# Patient Record
Sex: FEMALE | Race: WHITE | NOT HISPANIC OR LATINO | Employment: FULL TIME | URBAN - NONMETROPOLITAN AREA
[De-identification: names, ages, dates, MRNs, and addresses within clinical notes are randomized per-mention and may not be internally consistent; named-entity substitution may affect disease eponyms.]

---

## 2018-07-03 ENCOUNTER — TELEMEDICINE ORIGINATING SITE VISIT (OUTPATIENT)
Dept: MEDICAL GROUP | Facility: CLINIC | Age: 53
End: 2018-07-03
Payer: MEDICARE

## 2018-07-03 ENCOUNTER — TELEMEDICINE2 (OUTPATIENT)
Dept: MEDICAL GROUP | Age: 53
End: 2018-07-03
Payer: MEDICARE

## 2018-07-03 VITALS
HEIGHT: 61 IN | SYSTOLIC BLOOD PRESSURE: 107 MMHG | OXYGEN SATURATION: 96 % | BODY MASS INDEX: 22.09 KG/M2 | WEIGHT: 117 LBS | DIASTOLIC BLOOD PRESSURE: 69 MMHG | RESPIRATION RATE: 16 BRPM | HEART RATE: 98 BPM | TEMPERATURE: 99.3 F

## 2018-07-03 DIAGNOSIS — G35 MS (MULTIPLE SCLEROSIS) (HCC): ICD-10-CM

## 2018-07-03 DIAGNOSIS — Z86.39 H/O HASHIMOTO THYROIDITIS: ICD-10-CM

## 2018-07-03 DIAGNOSIS — N80.9 ENDOMETRIOSIS: ICD-10-CM

## 2018-07-03 DIAGNOSIS — Z13.220 SCREENING CHOLESTEROL LEVEL: ICD-10-CM

## 2018-07-03 DIAGNOSIS — E55.9 VITAMIN D DEFICIENCY: ICD-10-CM

## 2018-07-03 PROCEDURE — 99204 OFFICE O/P NEW MOD 45 MIN: CPT | Performed by: INTERNAL MEDICINE

## 2018-07-03 ASSESSMENT — PATIENT HEALTH QUESTIONNAIRE - PHQ9: CLINICAL INTERPRETATION OF PHQ2 SCORE: 0

## 2018-07-05 ASSESSMENT — ENCOUNTER SYMPTOMS
SEIZURES: 1
SENSORY CHANGE: 1
CARDIOVASCULAR NEGATIVE: 1
TINGLING: 1
GASTROINTESTINAL NEGATIVE: 1
PSYCHIATRIC NEGATIVE: 1
DOUBLE VISION: 0
FOCAL WEAKNESS: 1
MUSCULOSKELETAL NEGATIVE: 1
WEIGHT LOSS: 0
RESPIRATORY NEGATIVE: 1
BLURRED VISION: 1

## 2018-07-05 NOTE — PROGRESS NOTES
Subjective:      Jessica Colon is a 53 y.o. female who presents with Referral Needed            HPI 53-year-old female is here today to establish medical care.    1. Multiple sclerosis  Patient was diagnosed with MS in 1992. She was last seen by a neurologist except as 7 years ago. Patient's last brain MRI was 2009. Patient has been on SSDI since 2000 due to her MS. She has a history of being treated with Avonex and Copaxone but these medications not well tolerated due to side effects of severe exhaustion and scleroderma at the injection site. Patient did not feel she benefited from these medications. She was also treated with Neurontin and Provigil to help with symptomatic relief. She has not had a PCP in quite some time and would go to the emergency room with acute MS flare/exacerbation. Patient would be treated with a steroid and discharged from the emergency room. Patient complains of peripheral visual disturbances, right lower extremity paresthesias greater than left, extreme exhaustion, balance issues and instability. Patient denies bowel or bladder issues. Patient also has been having trouble with left upper extremity  and sensation. Patient is requesting a referral to neurology    2. Hashimoto's thyroiditis.  Diagnosed within the last 5 years and was seen by an endocrinologist in [place]. No recent lab work, currently not taking medications and is asymptomatic. Patient has had thyroid ultrasound for small thyroid nodules that were stable.    3. History of endometriosis  Patient underwent numerous abdominal laparoscopies and eventually underwent total abdominal hysterectomy.    4. Prevention.  Preventative labs completed greater than 2 years ago  Mammogram and Pap smear, completed greater than 5 years ago  Has never completed colonoscopy      Review of Systems   Constitutional: Positive for malaise/fatigue. Negative for weight loss.   HENT: Negative.    Eyes: Positive for blurred vision. Negative for  "double vision.   Respiratory: Negative.    Cardiovascular: Negative.    Gastrointestinal: Negative.    Genitourinary: Negative.    Musculoskeletal: Negative.    Skin: Negative.    Neurological: Positive for tingling, sensory change, focal weakness and seizures.   Psychiatric/Behavioral: Negative.           Objective:     /69   Pulse 98   Temp 37.4 °C (99.3 °F)   Resp 16   Ht 1.549 m (5' 1\")   Wt 53.1 kg (117 lb)   SpO2 96%   BMI 22.11 kg/m²      Physical Exam   Constitutional: She is oriented to person, place, and time. She appears well-developed and well-nourished.   HENT:   Head: Normocephalic and atraumatic.   Right Ear: External ear normal.   Left Ear: External ear normal.   Nose: Nose normal.   Mouth/Throat: Oropharynx is clear and moist. No oropharyngeal exudate.   Eyes: Conjunctivae and EOM are normal. Pupils are equal, round, and reactive to light. No scleral icterus.   No nystagmus noted   Neck: Normal range of motion. Thyromegaly present.   No thyroid nodules felt, no TTP   Cardiovascular: Normal rate, regular rhythm, S1 normal, S2 normal, normal heart sounds and intact distal pulses.  Exam reveals no gallop and no friction rub.    No murmur heard.  Pulmonary/Chest: Effort normal and breath sounds normal. No respiratory distress. She has no wheezes. She has no rales.   Abdominal: Soft. Bowel sounds are normal. She exhibits no distension. There is no tenderness. There is no rebound and no guarding.   Musculoskeletal: Normal range of motion. She exhibits no edema, tenderness or deformity.   Lymphadenopathy:     She has no cervical adenopathy.   Neurological: She is alert and oriented to person, place, and time. She has normal strength and normal reflexes. No cranial nerve deficit. Gait normal.   Difficult detailed neurologic exam via telemedicine  Muscle strength 4/5 lower extremity bilateral  Muscle strength decreased left upper extremity greater than right. Decreased  with left upper " extremity  Decreased sensation in upper extremities bilaterally  No gait disturbances noted   Skin: Skin is warm and dry. No rash noted. No erythema.   Psychiatric: She has a normal mood and affect. Her behavior is normal. Judgment normal.   Nursing note and vitals reviewed.              Assessment/Plan:     1. MS (multiple sclerosis) (HCC)  Referral to neurology via telemedicine/in person visit    2. H/O Hashimoto thyroiditis  Appropriate labs ordered to include thyroid function panel, anti-TPO, anti-tTG antibody  Stable    3. Endometriosis  Stable    $. Prevention  Preventative labs ordered  Patient due for colonoscopy  Recommend mammogram

## 2018-07-06 ENCOUNTER — TELEPHONE (OUTPATIENT)
Dept: MEDICAL GROUP | Facility: CLINIC | Age: 53
End: 2018-07-06

## 2018-07-06 NOTE — TELEPHONE ENCOUNTER
Neurology is requesting:   MRI (of the brain and cervical spine with and without contrast)    - prior to September visit.    Please place order.    Thank you!

## 2018-07-09 DIAGNOSIS — G35 MULTIPLE SCLEROSIS (HCC): ICD-10-CM

## 2018-07-10 ENCOUNTER — NON-PROVIDER VISIT (OUTPATIENT)
Dept: MEDICAL GROUP | Facility: CLINIC | Age: 53
End: 2018-07-10
Payer: MEDICARE

## 2018-07-10 DIAGNOSIS — G35 MS (MULTIPLE SCLEROSIS) (HCC): ICD-10-CM

## 2018-07-10 LAB
APPEARANCE UR: CLEAR
BILIRUB UR STRIP-MCNC: NEGATIVE MG/DL
COLOR UR AUTO: NORMAL
GLUCOSE UR STRIP.AUTO-MCNC: NEGATIVE MG/DL
KETONES UR STRIP.AUTO-MCNC: NEGATIVE MG/DL
LEUKOCYTE ESTERASE UR QL STRIP.AUTO: NEGATIVE
NITRITE UR QL STRIP.AUTO: NEGATIVE
PH UR STRIP.AUTO: 7 [PH] (ref 5–8)
PROT UR QL STRIP: NORMAL MG/DL
RBC UR QL AUTO: NORMAL
SP GR UR STRIP.AUTO: 1.01
UROBILINOGEN UR STRIP-MCNC: 1 MG/DL

## 2018-07-10 PROCEDURE — 81002 URINALYSIS NONAUTO W/O SCOPE: CPT | Performed by: INTERNAL MEDICINE

## 2018-07-10 PROCEDURE — 36415 COLL VENOUS BLD VENIPUNCTURE: CPT | Performed by: INTERNAL MEDICINE

## 2018-07-10 NOTE — NON-PROVIDER
Jessica Isaiah is a 53 y.o. female here for a non-provider visit for Venipuncture    If abnormal was an in office provider notified upon receipt of results (if so, indicate provider)? Yes  Routed to PCP? Yes

## 2018-07-12 DIAGNOSIS — G35 MULTIPLE SCLEROSIS (HCC): ICD-10-CM

## 2018-07-19 ENCOUNTER — TELEPHONE (OUTPATIENT)
Dept: MEDICAL GROUP | Facility: CLINIC | Age: 53
End: 2018-07-19

## 2018-07-19 NOTE — TELEPHONE ENCOUNTER
Patient called in with symptoms of a UTI. Urgency, heaviness, burning.    Would like a course of antibiotics going in to the weekend.    Please advise.

## 2018-07-23 ENCOUNTER — NON-PROVIDER VISIT (OUTPATIENT)
Dept: MEDICAL GROUP | Facility: CLINIC | Age: 53
End: 2018-07-23
Payer: MEDICARE

## 2018-07-23 ENCOUNTER — TELEMEDICINE2 (OUTPATIENT)
Dept: MEDICAL GROUP | Age: 53
End: 2018-07-23
Payer: MEDICARE

## 2018-07-23 ENCOUNTER — TELEMEDICINE ORIGINATING SITE VISIT (OUTPATIENT)
Dept: MEDICAL GROUP | Facility: CLINIC | Age: 53
End: 2018-07-23
Payer: MEDICARE

## 2018-07-23 VITALS
OXYGEN SATURATION: 97 % | SYSTOLIC BLOOD PRESSURE: 109 MMHG | BODY MASS INDEX: 22.92 KG/M2 | TEMPERATURE: 97.5 F | DIASTOLIC BLOOD PRESSURE: 73 MMHG | HEART RATE: 67 BPM | WEIGHT: 121.3 LBS

## 2018-07-23 DIAGNOSIS — R30.0 DYSURIA: ICD-10-CM

## 2018-07-23 DIAGNOSIS — R79.89 ELEVATED SERUM CREATININE: ICD-10-CM

## 2018-07-23 DIAGNOSIS — R20.2 PARESTHESIA: ICD-10-CM

## 2018-07-23 DIAGNOSIS — D72.829 LEUKOCYTOSIS, UNSPECIFIED TYPE: ICD-10-CM

## 2018-07-23 LAB
APPEARANCE UR: CLEAR
BILIRUB UR STRIP-MCNC: NEGATIVE MG/DL
COLOR UR AUTO: YELLOW
GLUCOSE UR STRIP.AUTO-MCNC: NEGATIVE MG/DL
KETONES UR STRIP.AUTO-MCNC: NEGATIVE MG/DL
LEUKOCYTE ESTERASE UR QL STRIP.AUTO: NEGATIVE
NITRITE UR QL STRIP.AUTO: NEGATIVE
PH UR STRIP.AUTO: 7.5 [PH] (ref 5–8)
PROT UR QL STRIP: NEGATIVE MG/DL
RBC UR QL AUTO: NORMAL
SP GR UR STRIP.AUTO: 1.02
UROBILINOGEN UR STRIP-MCNC: 1 MG/DL

## 2018-07-23 PROCEDURE — 81002 URINALYSIS NONAUTO W/O SCOPE: CPT | Performed by: INTERNAL MEDICINE

## 2018-07-23 PROCEDURE — 99214 OFFICE O/P EST MOD 30 MIN: CPT | Performed by: INTERNAL MEDICINE

## 2018-07-23 RX ORDER — CIPROFLOXACIN 500 MG/1
500 TABLET, FILM COATED ORAL 2 TIMES DAILY
Qty: 6 TAB | Refills: 0 | Status: SHIPPED | OUTPATIENT
Start: 2018-07-23 | End: 2019-09-19

## 2018-07-23 NOTE — PROGRESS NOTES
CC: Follow-up lab work    Verified identification with patient. Secured video conference with RN presenter in [facility]      HPI:   Jessica presents today with the following.    1. Dysuria  Patient comes in with a one-week history of dysuria, urinary frequency and urgency. Patient denies fevers chills, back pain or hematuria. Patient does not have a history of kidney stones. Does not have a history of frequent urinary tract infections.    2. Elevated serum creatinine  Creatinine elevated at 1.16 on most recent lab work   Patient was unable to complete brain MRI with and without contrast due to elevated creatinine levels.    3. Leukocytosis, unspecified type  White blood cell elevated at 13.2, elevated neutrophil count.      Patient Active Problem List    Diagnosis Date Noted   • Dysuria 07/23/2018   • MS (multiple sclerosis) ([facility]) 07/03/2018   • H/O Hashimoto thyroiditis 07/03/2018   • Endometriosis 07/03/2018       Current Outpatient Prescriptions   Medication Sig Dispense Refill   • ciprofloxacin (CIPRO) 500 MG Tab Take 1 Tab by mouth 2 times a day. 6 Tab 0     No current facility-administered medications for this visit.          Allergies as of 07/23/2018 - Reviewed 07/23/2018   Allergen Reaction Noted   • Demerol Anaphylaxis 07/03/2018        ROS: As per HPI. Patient denies all other cardiac pulmonary, GI, neurologic symptoms.    /73   Pulse 67   Temp 36.4 °C (97.5 °F)   Wt 55 kg (121 lb 4.8 oz)   SpO2 97%   BMI 22.92 kg/m²     Physical Exam:  Gen:         Alert and oriented, No apparent distress.  Neck:        No Lymphadenopathy or Bruits.  Lungs:     Clear to auscultation bilaterally, no wheezes rhonchi or crackles  CV:          Regular rate and rhythm. No murmurs, rubs or gallops.  Abd:         Soft and non distended. No CVA tenderness. Positive suprapubic tenderness  No  Hepatosplenomegaly, No pulsatile masses.                   Ext:          No clubbing, cyanosis, edema.      Assessment and  Plan.   53 y.o. female with the following issues.    1. Dysuria  Urinalysis positive for moderate blood, nitrite negative, leukocyte negative.  Treat empirically with Cipro  Send urine for culture and sensitivity  Push fluids  Recheck UA    - ciprofloxacin (CIPRO) 500 MG Tab; Take 1 Tab by mouth 2 times a day.  Dispense: 6 Tab; Refill: 0    2. Elevated serum creatinine  Recheck BMP after treatment with Cipro    - BASIC METABOLIC PANEL; Future    3. Leukocytosis, unspecified type  Recheck CBC after treatment for UTI    - CBC WITH DIFFERENTIAL; Future            Please note that this dictation was created using voice recognition software. I have made every reasonable attempt to correct obvious errors, but expect that there are errors of grammar and possible content that I did not discover before finalizing note.

## 2018-07-23 NOTE — NON-PROVIDER
Jessica Colon is a 53 y.o. female here for a non-provider visit for UA.    If abnormal was an in office provider notified today (if so, indicate provider)? Yes  Routed to PCP? Yes

## 2018-09-05 ENCOUNTER — TELEPHONE (OUTPATIENT)
Dept: MEDICAL GROUP | Facility: CLINIC | Age: 53
End: 2018-09-05

## 2018-09-05 NOTE — TELEPHONE ENCOUNTER
This note was sent by the referral department    The patient was scheduled for a TeleHealth visit with [facility] on 9/7/2018. Unfortunately the patient did not return the required paperwork or complete the required imaging for this new patient visit and the appointment has been canceled. If she would like to reschedule in the future, please have her give us a call at [phone] and we will assist them in scheduling their appointment    Please inform patient. Thanks

## 2018-09-21 ENCOUNTER — TELEMEDICINE ORIGINATING SITE VISIT (OUTPATIENT)
Dept: MEDICAL GROUP | Facility: CLINIC | Age: 53
End: 2018-09-21
Payer: MEDICAID

## 2018-09-21 ENCOUNTER — TELEMEDICINE2 (OUTPATIENT)
Dept: URGENT CARE | Facility: CLINIC | Age: 53
End: 2018-09-21
Payer: MEDICARE

## 2018-09-21 VITALS
SYSTOLIC BLOOD PRESSURE: 123 MMHG | HEART RATE: 72 BPM | TEMPERATURE: 98.5 F | WEIGHT: 121 LBS | DIASTOLIC BLOOD PRESSURE: 83 MMHG | OXYGEN SATURATION: 98 % | HEIGHT: 62 IN | RESPIRATION RATE: 16 BRPM | BODY MASS INDEX: 22.26 KG/M2

## 2018-09-21 DIAGNOSIS — R29.898 LEFT ARM WEAKNESS: ICD-10-CM

## 2018-09-21 DIAGNOSIS — G35 MULTIPLE SCLEROSIS (HCC): ICD-10-CM

## 2018-09-21 DIAGNOSIS — G35 MS (MULTIPLE SCLEROSIS) (HCC): ICD-10-CM

## 2018-09-21 PROCEDURE — 99214 OFFICE O/P EST MOD 30 MIN: CPT | Performed by: PHYSICIAN ASSISTANT

## 2018-09-21 RX ORDER — METHYLPREDNISOLONE 4 MG/1
TABLET ORAL
Qty: 1 KIT | Refills: 0 | Status: SHIPPED | OUTPATIENT
Start: 2018-09-21 | End: 2019-01-17 | Stop reason: SDUPTHER

## 2018-09-21 ASSESSMENT — ENCOUNTER SYMPTOMS
FEVER: 0
CHILLS: 0
BLURRED VISION: 1
FALLS: 0
WEAKNESS: 1
FOCAL WEAKNESS: 0
DIZZINESS: 0
EYE REDNESS: 0
SENSORY CHANGE: 1
TINGLING: 1
WHEEZING: 0
ABDOMINAL PAIN: 0
EYE DISCHARGE: 0
LOSS OF CONSCIOUSNESS: 0
DIARRHEA: 0
VERTIGO: 0
COUGH: 0
VISUAL CHANGE: 1
JOINT SWELLING: 0

## 2018-09-21 NOTE — PROGRESS NOTES
Subjective:      Jessica Colon is a 53 y.o. female who presents with Shoulder Pain (Left) and Multiple Sclerosis            Patient is a pleasant 53-year-old female via telemedicine who presents with concern regarding progressive left arm weakness over the last 2-3 weeks.  Patient does report a history of MS of which she does report recent history of increased flares.  Patient reports an episode similar within the last year of when patient developed right arm weakness and paresthesia-like pain however not on the left.  She denies any recent injury, trauma or fall.  Patient denies any specific joint pain although does report that the posterior shoulder with radiation down the back of her arm at times into her fourth and fifth digits have a burning-like sensation.  Patient does report remarkable weakness in her left hand and .  Patient does report slight worsening to her vision to where she feels that she has to focus more than usual.  Patient does report that this episode is slightly different as patient does not have the patches of burning into the legs that she had in the past.  Patient unfortunately missed her neurology appointment recently as patient's creatinine was too large to have the MRI with contrast.  Patient is in the process of rescheduling at this time.      Shoulder Pain   This is a new problem. The current episode started 1 to 4 weeks ago. The problem occurs constantly. The problem has been gradually worsening. Associated symptoms include a visual change and weakness. Pertinent negatives include no abdominal pain, chills, congestion, coughing, fever, joint swelling, rash or vertigo. Nothing aggravates the symptoms. Treatments tried: Caffeine. The treatment provided moderate relief.       Review of Systems   Constitutional: Positive for malaise/fatigue. Negative for chills and fever.   HENT: Negative for congestion.    Eyes: Positive for blurred vision. Negative for discharge and redness.  "  Respiratory: Negative for cough and wheezing.    Gastrointestinal: Negative for abdominal pain and diarrhea.   Musculoskeletal: Negative for falls, joint pain and joint swelling.   Skin: Negative for rash.   Neurological: Positive for tingling, sensory change and weakness. Negative for dizziness, vertigo, focal weakness and loss of consciousness.   All other systems reviewed and are negative.         Objective:     /83 (BP Location: Right arm, Patient Position: Sitting)   Pulse 72   Temp 36.9 °C (98.5 °F)   Resp 16   Ht 1.562 m (5' 1.5\")   Wt 54.9 kg (121 lb)   SpO2 98%   BMI 22.49 kg/m²      Physical Exam   Constitutional: She is oriented to person, place, and time. She appears well-developed and well-nourished. No distress.   HENT:   Head: Normocephalic and atraumatic.   Eyes: Pupils are equal, round, and reactive to light. Conjunctivae and EOM are normal.   Neck: Normal range of motion. Neck supple. No tracheal deviation present.   Cardiovascular: Normal rate.    Pulmonary/Chest: Effort normal. No respiratory distress.   Neurological: She is alert and oriented to person, place, and time. She displays abnormal reflex. She exhibits abnormal muscle tone.    substantially weaker on the left than the right-per RN conducting physical. DTRs 1+ LUE..   Shoulder:  Without noted swelling, erythema, ecchymosis, or noted deformity. Non-tender over the bony prominences, also without tenderness over the SC or AC joints.  FROM with Flexion, abduction, internal and external rotation, without noted winging of the scapula.   Negative Neer’s test .      Skin: Skin is warm. No rash noted.   Psychiatric: She has a normal mood and affect. Her behavior is normal. Judgment and thought content normal.   Vitals reviewed.              Assessment/Plan:     1. Multiple sclerosis (HCC)  - MethylPREDNISolone (MEDROL DOSEPAK) 4 MG Tablet Therapy Pack; UAD  Dispense: 1 Kit; Refill: 0    2. Left arm weakness  - " MethylPREDNISolone (MEDROL DOSEPAK) 4 MG Tablet Therapy Pack; UAD  Dispense: 1 Kit; Refill: 0    Discussed various treatments at this time of which patient readily admits that she is done well on injectable steroids.  Unfortunately at this time at the [facility] steroid injections are not readily available.  Patient does report that she did have notable agitation with a prednisone taper in the past however she has not been on  methylprednisolone of which I suspect patient was given a such in the ER in the the past.  Patient is open to starting on such however patient was given very strict precautions along with potential side effects.  I strongly encouraged that if patient progressively remains weaker that she must have evaluation at [facility].  However if symptoms remain baseline then strongly encourage patient either way to follow-up with neurology and pursue further imaging of MRIs as it does appear that patient's MS is getting progressively worse.  Patient given precautionary s/sx that mandate immediate follow up and evaluation in the ED. Advised of risks of not doing so.    DDX, Supportive care, and indications for immediate follow-up discussed with patient.    Instructed to return to clinic or nearest emergency department if we are not available for any change in condition, further concerns, or worsening of symptoms.    The patient demonstrated a good understanding and agreed with the treatment plan.  Please note that this dictation was created using voice recognition software. I have made every reasonable attempt to correct obvious errors, but I expect that there are errors of grammar and possibly content that I did not discover before finalizing the note.

## 2018-10-03 ENCOUNTER — TELEMEDICINE ORIGINATING SITE VISIT (OUTPATIENT)
Dept: MEDICAL GROUP | Facility: CLINIC | Age: 53
End: 2018-10-03
Payer: MEDICAID

## 2018-10-03 ENCOUNTER — TELEMEDICINE2 (OUTPATIENT)
Dept: MEDICAL GROUP | Age: 53
End: 2018-10-03
Payer: MEDICARE

## 2018-10-03 VITALS
SYSTOLIC BLOOD PRESSURE: 120 MMHG | OXYGEN SATURATION: 98 % | BODY MASS INDEX: 22.26 KG/M2 | RESPIRATION RATE: 16 BRPM | TEMPERATURE: 98.6 F | WEIGHT: 121 LBS | HEIGHT: 62 IN | HEART RATE: 82 BPM | DIASTOLIC BLOOD PRESSURE: 78 MMHG

## 2018-10-03 DIAGNOSIS — G35 MS (MULTIPLE SCLEROSIS) (HCC): ICD-10-CM

## 2018-10-03 PROCEDURE — 99213 OFFICE O/P EST LOW 20 MIN: CPT | Performed by: INTERNAL MEDICINE

## 2018-10-03 RX ORDER — MELOXICAM 15 MG/1
15 TABLET ORAL DAILY
Qty: 30 TAB | Refills: 5 | Status: SHIPPED | OUTPATIENT
Start: 2018-10-03 | End: 2019-09-19

## 2018-10-03 NOTE — PROGRESS NOTES
CC: Follow-up MS    Verified identification with patient. Secured video conference with RN presenter in [facility]      HPI:   Jessica presents today with the following.    1. MS (multiple sclerosis) (HCC)  Patient was diagnosed with MS in 1992. She was last seen by a neurologist except as 7 years ago. Patient's last brain MRI was 2009. Patient has been on SSDI since 2000 due to her MS. She has a history of being treated with Avonex and Copaxone but these medications not well tolerated due to side effects of severe exhaustion and scleroderma at the injection site. Patient did not feel she benefited from these medications. She was also treated with Neurontin and Provigil to help with symptomatic relief. She has not had a PCP in quite some time and would go to the emergency room with acute MS flare/exacerbation. Patient would be treated with a steroid and discharged from the emergency room. Patient complains of peripheral visual disturbances, right lower extremity paresthesias greater than left, extreme exhaustion, balance issues and instability. Patient denies bowel or bladder issues. Patient also has been having trouble with left upper extremity  and sensation. Patient is requesting a referral to neurology    Update 10/3/18:   Patient was seen in urgent care on September 21 with an MS flare involving her left arm. Patient was given a steroid taper which initially else her symptoms but now have recurred. Patient describes left arm weakness, paresthesia, difficulty with  but denies pain.  Patient has been having increased stressors in her life, went to [place] for family issues.  Patient has not completed her brain MRI due to elevated creatinine and had to cancel her neurology appointment.  Patient requesting referral to neurology as her insurance has changed and she now has Medicaid.  She is requesting an anti-inflammatory which she has used in the past for her MS. She was treated with Vioxx/Celebrex.  "Patient is reluctant to be treated with steroids.      Patient Active Problem List    Diagnosis Date Noted   • Dysuria 07/23/2018   • MS (multiple sclerosis) (HCC) 07/03/2018   • H/O Hashimoto thyroiditis 07/03/2018   • Endometriosis 07/03/2018       Current Outpatient Prescriptions   Medication Sig Dispense Refill   • meloxicam (MOBIC) 15 MG tablet Take 1 Tab by mouth every day. 30 Tab 5   • MethylPREDNISolone (MEDROL DOSEPAK) 4 MG Tablet Therapy Pack UAD 1 Kit 0   • ciprofloxacin (CIPRO) 500 MG Tab Take 1 Tab by mouth 2 times a day. 6 Tab 0     No current facility-administered medications for this visit.          Allergies as of 10/03/2018 - Reviewed 10/03/2018   Allergen Reaction Noted   • Demerol Anaphylaxis 07/03/2018        ROS: As per HPI. Complains of weakness, mild visual changes, imbalance. Otherwise denies all other cardiopulmonary, GI, neurologic symptoms    /78 (BP Location: Right arm, Patient Position: Sitting)   Pulse 82   Temp 37 °C (98.6 °F)   Resp 16   Ht 1.562 m (5' 1.5\")   Wt 54.9 kg (121 lb)   SpO2 98%   BMI 22.49 kg/m²     Physical Exam:  Gen:         Alert and oriented, No apparent distress.  Neck:        No Lymphadenopathy or Bruits.  Lungs:     Clear to auscultation bilaterally , no wheezes rhonchi or crackles  CV:          Regular rate and rhythm. No murmurs, rubs or gallops.  Abd:         Soft non tender, non distended. Normal active bowel sounds.  No  Hepatosplenomegaly, No pulsatile masses.                 Ext:          No clubbing, cyanosis, edema.  Neuro:     Left upper extremity. Good range of motion of left shoulder girdle. Noted decreased muscle strength 4/5 upper extremity. Decreased breath. Sensation intact.    Assessment and Plan.   53 y.o. female with the following issues.    1. MS (multiple sclerosis) (HCC)  Completed Medrol Dosepak  Trial with Mobic 15 mg daily  Referral to neurology  RTC 4 weeks    - meloxicam (MOBIC) 15 MG tablet; Take 1 Tab by mouth every day.  " Dispense: 30 Tab; Refill: 5  - REFERRAL TO NEUROLOGY            Please note that this dictation was created using voice recognition software. I have made every reasonable attempt to correct obvious errors, but expect that there are errors of grammar and possible content that I did not discover before finalizing note.

## 2018-10-09 ENCOUNTER — NON-PROVIDER VISIT (OUTPATIENT)
Dept: MEDICAL GROUP | Facility: CLINIC | Age: 53
End: 2018-10-09
Payer: MEDICAID

## 2018-10-09 DIAGNOSIS — G35 MS (MULTIPLE SCLEROSIS) (HCC): ICD-10-CM

## 2018-10-09 PROCEDURE — 36415 COLL VENOUS BLD VENIPUNCTURE: CPT | Performed by: INTERNAL MEDICINE

## 2018-10-10 ENCOUNTER — TELEPHONE (OUTPATIENT)
Dept: MEDICAL GROUP | Facility: CLINIC | Age: 53
End: 2018-10-10

## 2018-12-18 ENCOUNTER — TELEPHONE (OUTPATIENT)
Dept: MEDICAL GROUP | Facility: CLINIC | Age: 53
End: 2018-12-18

## 2018-12-19 DIAGNOSIS — G35 MULTIPLE SCLEROSIS (HCC): ICD-10-CM

## 2019-01-17 ENCOUNTER — TELEMEDICINE ORIGINATING SITE VISIT (OUTPATIENT)
Dept: MEDICAL GROUP | Facility: CLINIC | Age: 54
End: 2019-01-17
Payer: MEDICARE

## 2019-01-17 ENCOUNTER — TELEMEDICINE2 (OUTPATIENT)
Dept: MEDICAL GROUP | Age: 54
End: 2019-01-17
Payer: MEDICARE

## 2019-01-17 ENCOUNTER — TELEMEDICINE2 (OUTPATIENT)
Dept: NEUROLOGY | Facility: MEDICAL CENTER | Age: 54
End: 2019-01-17
Payer: MEDICARE

## 2019-01-17 VITALS
DIASTOLIC BLOOD PRESSURE: 67 MMHG | WEIGHT: 121 LBS | OXYGEN SATURATION: 96 % | HEART RATE: 76 BPM | RESPIRATION RATE: 16 BRPM | BODY MASS INDEX: 22.26 KG/M2 | SYSTOLIC BLOOD PRESSURE: 100 MMHG | TEMPERATURE: 97.1 F | HEIGHT: 62 IN

## 2019-01-17 VITALS
RESPIRATION RATE: 16 BRPM | DIASTOLIC BLOOD PRESSURE: 73 MMHG | TEMPERATURE: 98.6 F | HEIGHT: 62 IN | SYSTOLIC BLOOD PRESSURE: 119 MMHG | HEART RATE: 78 BPM | WEIGHT: 121 LBS | OXYGEN SATURATION: 96 % | BODY MASS INDEX: 22.26 KG/M2

## 2019-01-17 DIAGNOSIS — N28.9 RENAL INSUFFICIENCY: ICD-10-CM

## 2019-01-17 DIAGNOSIS — R29.818 OTHER SYMPTOMS AND SIGNS INVOLVING THE NERVOUS SYSTEM: ICD-10-CM

## 2019-01-17 DIAGNOSIS — G35 MS (MULTIPLE SCLEROSIS) (HCC): ICD-10-CM

## 2019-01-17 DIAGNOSIS — E53.8 B12 DEFICIENCY: ICD-10-CM

## 2019-01-17 DIAGNOSIS — Z86.39 H/O HASHIMOTO THYROIDITIS: ICD-10-CM

## 2019-01-17 PROBLEM — E56.9 VITAMIN DEFICIENCY: Status: RESOLVED | Noted: 2019-01-17 | Resolved: 2019-01-17

## 2019-01-17 PROBLEM — E56.9 VITAMIN DEFICIENCY: Status: ACTIVE | Noted: 2019-01-17

## 2019-01-17 PROCEDURE — 99204 OFFICE O/P NEW MOD 45 MIN: CPT | Performed by: PSYCHIATRY & NEUROLOGY

## 2019-01-17 PROCEDURE — 99213 OFFICE O/P EST LOW 20 MIN: CPT | Performed by: INTERNAL MEDICINE

## 2019-01-17 RX ORDER — METHYLPREDNISOLONE 4 MG/1
TABLET ORAL
Qty: 1 KIT | Refills: 0 | Status: SHIPPED | OUTPATIENT
Start: 2019-01-17 | End: 2019-09-19 | Stop reason: SDUPTHER

## 2019-01-17 ASSESSMENT — PATIENT HEALTH QUESTIONNAIRE - PHQ9: CLINICAL INTERPRETATION OF PHQ2 SCORE: 0

## 2019-01-17 NOTE — PROGRESS NOTES
NEUROLOGY NOTE    Referring Physician  self      CHIEF COMPLAINT:    The patient was diagnosed as multiple sclerosis by neurologist in [place] 1992  The patient would like to establish neurologic care with us because the social security service refused to pay her if she is not seeing neurologist  The last time she saw neurologist in [place] was 5 years ago    The patient was on Avonex ( sides effects), Copaxone ( 2 years, skin became thick and painful)    Chief Complaint   Patient presents with   • Establish Care     MS       PRESENT ILLNESS:       The patient was diagnosed as multiple sclerosis by neurologist in [place] 1992  The patient would like to establish neurologic care with us because the social security service refused to pay her if she is not seeing neurologist  The last time she saw neurologist in [place] was 5 years ago    The patient was on Avonex ( sides effects), Copaxone ( 2 years, skin became thick and painful)  Copaxone was the last medication she was taking in 5 years    Because the social security service refused to pay , the patient has no choice but to make an appointment to see us    The patient did admit that she only asked for IV solumedrol     The patient has been on disability since 2000-- but recently, the social security service would like to revisit the necessisity of putting the patient on disability-- that scared the patient    PAST MEDICAL HISTORY:  Past Medical History:   Diagnosis Date   • Dysuria 7/23/2018   • Endometriosis 7/3/2018   • H/O Hashimoto thyroiditis 7/3/2018   • MS (multiple sclerosis) ([facility]) 7/3/2018       PAST SURGICAL HISTORY:  Past Surgical History:   Procedure Laterality Date   • LAPAROSCOPY  x 7   • VAGINAL HYSTERECTOMY TOTAL         FAMILY HISTORY:  Family History   Problem Relation Age of Onset   • Diabetes Father    • Cancer Paternal Grandmother         breast   • Cancer Maternal Aunt         breast       SOCIAL HISTORY:  Social History     Social History   •  Marital status: Single     Spouse name: N/A   • Number of children: N/A   • Years of education: N/A     Occupational History   • Not on file.     Social History Main Topics   • Smoking status: Current Every Day Smoker     Packs/day: 0.50     Years: 20.00     Types: Cigarettes   • Smokeless tobacco: Never Used   • Alcohol use 2.4 oz/week     4 Cans of beer per week   • Drug use: No   • Sexual activity: Yes     Partners: Male     Other Topics Concern   • Not on file     Social History Narrative   • No narrative on file     ALLERGIES:  Allergies   Allergen Reactions   • Demerol Anaphylaxis     TOBHX  History   Smoking Status   • Current Every Day Smoker   • Packs/day: 0.50   • Years: 20.00   • Types: Cigarettes   Smokeless Tobacco   • Never Used     ALCHX  History   Alcohol Use   • 2.4 oz/week   • 4 Cans of beer per week     DRUGHX  History   Drug Use No           MEDICATIONS:  Current Outpatient Prescriptions   Medication   • meloxicam (MOBIC) 15 MG tablet   • MethylPREDNISolone (MEDROL DOSEPAK) 4 MG Tablet Therapy Pack   • ciprofloxacin (CIPRO) 500 MG Tab     No current facility-administered medications for this visit.        REVIEW OF SYSTEM:    Constitutional: Denies fevers, Denies weight changes   Eyes: Denies changes in vision, no eye pain   Ears/Nose/Throat/Mouth: Denies nasal congestion or sore throat   Cardiovascular: Denies chest pain or palpitations   Respiratory: Denies SOB.   Gastrointestinal/Hepatic: Denies abdominal pain, nausea, vomiting, diarrhea, constipation or GI bleeding   Genitourinary: kidney  Musculoskeletal/Rheum: Denies joint pain and swelling   Skin/Breast: Denies rash, denies breast lumps or discharge   Neurological: diagnosed as multiple sclerosis years ago  Psychiatric: denies mood disorder   Endocrine: Hashimoto thyroidist  Heme/Oncology/Lymph Nodes: Denies enlarged lymph nodes, denies brusing or known bleeding disorder   Allergic/Immunologic: Denies hx of allergies         PHYSICAL AND  "NEUROLOGICAL EXMAINATIONS:  VITAL SIGNS: /73 (BP Location: Right arm, Patient Position: Sitting, BP Cuff Size: Adult)   Pulse 78   Temp 37 °C (98.6 °F) (Tympanic)   Resp 16   Ht 1.562 m (5' 1.5\")   Wt 54.9 kg (121 lb)   SpO2 96%   BMI 22.49 kg/m²   CURRENT WEIGHT:   BMI: Body mass index is 22.49 kg/m².  PREVIOUS WEIGHTS:  Wt Readings from Last 25 Encounters:   01/17/19 54.9 kg (121 lb)   10/03/18 54.9 kg (121 lb)   09/21/18 54.9 kg (121 lb)   07/23/18 55 kg (121 lb 4.8 oz)   07/03/18 53.1 kg (117 lb)       General appearance of patient: WDWN(+) NAD(+)    EYES  o Fundus : Papilledem(-) Exudates(-) Hemorrhage(-)  Nervous System  Orientation to time, place and person(+)  Memory normal(-)  Language: aphasia(-)  Knowledge: past(+) Current(+)  Attention(+)  Cranial Nerves  • Nerve 2: intact  • Nerve 3,4,6: intact  • Nerve 5 : intact  • Nerve 7: intact  • Nerve 8: intact  • Nerve 9 & 10: intact  • Nerve 11: intact  • Nerve 12: intact  Muscle Power and muscle tone: symmetric, normal in upper and lower  Sensory System: Pin sensation intact(+)  Reflexes: symmetric throughout  Cerebellar Function FNP normal   Gait : Steady(-)  Heart and Vascular  Peripheral Vasucular system : Edema (-) Swelling(-)  RHB, Breathing sound clear  abdomen bowel sound normoactive  Extremities freely moveable  Joints no contracture       NEUROIMAGING:     LAB:        1. The patient lives in [facility] alone      IMPRESSION:    1. Hx of multiple sclerosis-- initially diagnosed by her former neurologist in [place] and [place]  2. Hx of Hashimoto thyroidist, Hx of lost of Vision of right eye?   3. Kidney problem    PLAN/RECOMMENDATIONS:      We will offer MRI of brain and MRI of cervical spine to re-examine the diagnosis of multiple sclerosis  We will offer blood tests  Please contact us one week after these tests  We then decide the next step -- either coming to [place] for detailed examination by us or staying in [facility] to get her " medical care      SIGNATURE:  Angie Sanchez    CC:  Pau Dominguez M.D.

## 2019-01-17 NOTE — PATIENT INSTRUCTIONS
1. The patient lives in [facility] alone      IMPRESSION:    1. Hx of multiple sclerosis-- initially diagnosed by her former neurologist in [place] and [place]  2. Hx of Hashimoto thyroidist, Hx of lost of Vision of right eye?   3. Kidney problem    PLAN/RECOMMENDATIONS:      We will offer MRI of brain and MRI of cervical spine to re-examine the diagnosis of multiple sclerosis  We will offer blood tests  Please contact us one week after these tests  We then decide the next step -- either coming to [place] for detailed examination by us or staying in [facility] to get her medical care      SIGNATURE:  Angie Sanchez    CC:  Pau Dominguez M.D.

## 2019-01-18 NOTE — PROGRESS NOTES
"CC: Follow-up lab work, MS and fatigue    Verified identification with patient. Secured video conference with RN presenter in [facility]      HPI:   Jessica presents today with the following.    1. MS (multiple sclerosis) (HCC)  Patient was seen and evaluated by neurology today for diagnosis of MS.  Patient is currently off Copaxone.  Neurology has ordered MRI of brain and C-spine with additional lab work.  Patient complains of fatigue left-sided weakness, numbness and tingling feeling of off balance for 1 week.  Symptoms however are improved today.  Patient has difficulty lifting objects and has also noted some visual disturbance in the right eye which has been chronic and  of decreased function in her left hand.  Patient has tolerated low-dose steroids in the past.    2. Renal insufficiency  Follow-up creatinine 1.08, down from 1.16.      Patient Active Problem List    Diagnosis Date Noted   • B12 deficiency 01/17/2019   • Renal insufficiency 01/17/2019   • Dysuria 07/23/2018   • MS (multiple sclerosis) (HCC) 07/03/2018   • H/O Hashimoto thyroiditis 07/03/2018   • Endometriosis 07/03/2018       Current Outpatient Prescriptions   Medication Sig Dispense Refill   • MethylPREDNISolone (MEDROL DOSEPAK) 4 MG Tablet Therapy Pack UAD 1 Kit 0   • meloxicam (MOBIC) 15 MG tablet Take 1 Tab by mouth every day. 30 Tab 5   • ciprofloxacin (CIPRO) 500 MG Tab Take 1 Tab by mouth 2 times a day. (Patient not taking: Reported on 1/17/2019) 6 Tab 0     No current facility-administered medications for this visit.          Allergies as of 01/17/2019 - Reviewed 01/17/2019   Allergen Reaction Noted   • Demerol Anaphylaxis 07/03/2018        ROS: As per HPI.  Eyes all other cardiopulmonary, GI, neurologic symptoms.    /67 (BP Location: Right arm, Patient Position: Sitting)   Pulse 76   Temp 36.2 °C (97.1 °F)   Resp 16   Ht 1.562 m (5' 1.5\")   Wt 54.9 kg (121 lb)   SpO2 96%   BMI 22.49 kg/m²     Physical Exam:  Gen:         " Alert and oriented, No apparent distress.  Neck:        No Lymphadenopathy or Bruits.  Lungs:     Clear to auscultation bilaterally  CV:          Regular rate and rhythm. No murmurs, rubs or gallops.  Abd:         Soft non tender, non distended. Normal active bowel sounds.  No  Hepatosplenomegaly, No pulsatile masses.                   Ext:          No clubbing, cyanosis, edema.  Neuro:     Muscle tone and sensations intact bilaterally upper and lower extremity.  Gait steady without listing.    Assessment and Plan.   53 y.o. female with the following issues.    1. MS (multiple sclerosis) (HCC)  Patient stable at this point  Please requested MRI of brain, cervical spine and lab work follow-up with neurology  Provided prescription for Medrol Dosepak symptoms recur, ER precautions provided    - MethylPREDNISolone (MEDROL DOSEPAK) 4 MG Tablet Therapy Pack; UAD  Dispense: 1 Kit; Refill: 0    2. Renal insufficiency  Stable            Please note that this dictation was created using voice recognition software. I have made every reasonable attempt to correct obvious errors, but expect that there are errors of grammar and possible content that I did not discover before finalizing note.

## 2019-01-23 ENCOUNTER — NON-PROVIDER VISIT (OUTPATIENT)
Dept: MEDICAL GROUP | Facility: CLINIC | Age: 54
End: 2019-01-23
Payer: MEDICARE

## 2019-01-23 PROCEDURE — 36415 COLL VENOUS BLD VENIPUNCTURE: CPT | Performed by: INTERNAL MEDICINE

## 2019-01-23 NOTE — NON-PROVIDER
Jessica Colon is a 54 y.o. female here for a non-provider visit for Venipuncture    If abnormal was an in office provider notified today (if so, indicate provider)? Yes  Routed to PCP? Yes

## 2019-01-31 ENCOUNTER — TELEPHONE (OUTPATIENT)
Dept: NEUROLOGY | Facility: MEDICAL CENTER | Age: 54
End: 2019-01-31

## 2019-01-31 NOTE — TELEPHONE ENCOUNTER
Patient is scheduled to have an MRI done 02/01/19. She stated that before the creatine levels were not in parameter for contrast. After doing labs 2 weeks ago they are now at a good level. She wanted to know if you would like to add contrast? Please advise.

## 2019-02-08 ENCOUNTER — TELEPHONE (OUTPATIENT)
Dept: NEUROLOGY | Facility: MEDICAL CENTER | Age: 54
End: 2019-02-08

## 2019-02-09 NOTE — TELEPHONE ENCOUNTER
Apologies for the second encounter, MRI BRAIN WITHOUT is scanned in media as well.     Please advise.

## 2019-02-12 ENCOUNTER — TELEPHONE (OUTPATIENT)
Dept: NEUROLOGY | Facility: MEDICAL CENTER | Age: 54
End: 2019-02-12

## 2019-02-12 NOTE — TELEPHONE ENCOUNTER
Please tell the patient  MRI of brain and cervical spine -- consistent with multiple sclerosis based on the radiologists' report    I will need to see the patient face to face to examine the patient and discuss about the treatment choice etc    ________________________________________________________________________      Please help the patient to make an appointment with me within 3 months face to face--- not telemedicine  Please ask the patient to get the original CD of MRI of brain and cervical spine in the next visit with me face to face  ________________________________________________________________________

## 2019-02-13 NOTE — TELEPHONE ENCOUNTER
Please tell the patient  MRI of brain and cervical spine -- consistent with multiple sclerosis based on the radiologists' report     I will need to see the patient face to face to examine the patient and discuss about the treatment choice etc       Called pt on 02/12 and phone was not taking calls or messages at this time will continue to call to set up follow up appt.

## 2019-02-14 NOTE — TELEPHONE ENCOUNTER
Please tell the patient  MRI of brain and cervical spine -- consistent with multiple sclerosis based on the radiologists' report     I will need to see the patient face to face to examine the patient and discuss about the treatment choice etc        Called pt on 02/12 and phone was not taking calls or messages at this time will continue to call to set up follow up appt.     Spoke to patient regarding above information. Made appointment for the Feb. 20. Patient stated she did not want to wait for 3 months due to possibly  losing her SSI.

## 2019-09-19 ENCOUNTER — OFFICE VISIT (OUTPATIENT)
Dept: MEDICAL GROUP | Facility: CLINIC | Age: 54
End: 2019-09-19
Payer: MEDICARE

## 2019-09-19 VITALS
BODY MASS INDEX: 23.41 KG/M2 | HEIGHT: 61 IN | TEMPERATURE: 98.4 F | SYSTOLIC BLOOD PRESSURE: 101 MMHG | DIASTOLIC BLOOD PRESSURE: 68 MMHG | HEART RATE: 62 BPM | OXYGEN SATURATION: 97 % | WEIGHT: 124 LBS | RESPIRATION RATE: 16 BRPM

## 2019-09-19 DIAGNOSIS — N28.9 RENAL INSUFFICIENCY: ICD-10-CM

## 2019-09-19 DIAGNOSIS — R53.83 OTHER FATIGUE: ICD-10-CM

## 2019-09-19 DIAGNOSIS — E53.8 B12 DEFICIENCY: ICD-10-CM

## 2019-09-19 DIAGNOSIS — Z86.39 H/O HASHIMOTO THYROIDITIS: ICD-10-CM

## 2019-09-19 DIAGNOSIS — G35 MS (MULTIPLE SCLEROSIS) (HCC): ICD-10-CM

## 2019-09-19 PROCEDURE — 81002 URINALYSIS NONAUTO W/O SCOPE: CPT | Performed by: PHYSICIAN ASSISTANT

## 2019-09-19 PROCEDURE — 99214 OFFICE O/P EST MOD 30 MIN: CPT | Performed by: PHYSICIAN ASSISTANT

## 2019-09-19 RX ORDER — METHYLPREDNISOLONE 4 MG/1
TABLET ORAL
Qty: 1 KIT | Refills: 0 | Status: SHIPPED | OUTPATIENT
Start: 2019-09-19 | End: 2019-11-13

## 2019-09-19 NOTE — PROGRESS NOTES
"cc:  MS symptoms    Subjective:     Jessica Colon is a 54 y.o. female presenting as she is concerned about MS symptoms      Patient presents to office and is a new patient to me.  She has a history of MS.  She states that she is having strange symptoms.  Usually it is a slow progression forward and back but lately it has been more quick.  She has been more fatigued.  She states she has been having difficulty with stepping up.  She is having problems with orientation in the world.  She is having muscle weakness.  She is having tremors on the left side with left leg and left hand.  She has been told she has plaques on her spinal column.  The fatigue is the worst.  She is currently not on MS medications due to side effects.  She has not been able to afford gabapentin and provigil.  She states that she lost her social security disability.  She tried to get to her neurology appointment but her car broke down outside of [place].     Review of systems:  See above.  Symptoms as indicated.      Current Outpatient Medications:   •  methylPREDNISolone (MEDROL DOSEPAK) 4 MG Tablet Therapy Pack, UAD, Disp: 1 Kit, Rfl: 0    Allergies, past medical history, past surgical history, family history, social history reviewed and updated    Objective:     Vitals: /68 (BP Location: Right arm, Patient Position: Sitting)   Pulse 62   Temp 36.9 °C (98.4 °F)   Resp 16   Ht 1.549 m (5' 1\")   Wt 56.2 kg (124 lb)   SpO2 97%   BMI 23.43 kg/m²   General: Alert, pleasant, NAD  EYES:   PERRL, EOMI, no icterus or pallor.  Conjunctivae and lids normal.   HENT:  Normocephalic.  External ears normal.  No nasal drainage present.  Neck supple.    Abdomen: Not obese  Skin: Warm, dry, no rashes.  Musculoskeletal: Gait is normal.  Moves all extremities well.  Neurological: No tremors, sensation grossly intact, CN2-12 intact, tremor seen in left hand and arm from video patient provided  Psych:  Affect/mood is normal, judgement is good, memory is " intact, grooming is appropriate.    Assessment/Plan:     Jessica was seen today for multiple sclerosis.    Diagnoses and all orders for this visit:    MS (multiple sclerosis) (HCC)  -     methylPREDNISolone (MEDROL DOSEPAK) 4 MG Tablet Therapy Pack; UAD  -     CBC WITH DIFFERENTIAL; Future  -     Comp Metabolic Panel; Future  -     Cancel: REFERRAL TO NEUROLOGY  -     REFERRAL TO NEUROLOGY  H/O Hashimoto thyroiditis  -     TSH+FREE T4  Renal insufficiency  -     CBC WITH DIFFERENTIAL; Future  -     Comp Metabolic Panel; Future  B12 deficiency  -     CBC WITH DIFFERENTIAL; Future  -     Comp Metabolic Panel; Future  -     VITAMIN B12; Future  -     FOLATE; Future  Other fatigue  -     CBC WITH DIFFERENTIAL; Future  -     Comp Metabolic Panel; Future  -     VITAMIN B12; Future  -     FOLATE; Future  -     VITAMIN D,25 HYDROXY; Future  -     Cancel: IRON/TOTAL IRON BIND; Future  -     URINALYSIS,CULTURE IF INDICATED; Future  -     POCT Urine Dipstick Glucose    Labs been ordered to evaluate further.  We will check for urinary tract infection.  Will start patient on a Medrol Dosepak.  Patient unable to provide a urine sample at this time.  As I will be out of the office tomorrow, if urine appears positive for infection, can go ahead and call in Cipro 500 mg twice a day for 1 week.  We will follow-up in 2 weeks and submit a referral to neurology.    Return in about 2 weeks (around 10/3/2019).    Please note that this dictation was created using voice recognition software. I have made every reasonable attempt to correct obvious errors, but expect that there are errors of grammar and possible content that I did not discover before finalizing note.

## 2019-09-23 ENCOUNTER — NON-PROVIDER VISIT (OUTPATIENT)
Dept: MEDICAL GROUP | Facility: CLINIC | Age: 54
End: 2019-09-23
Payer: MEDICARE

## 2019-09-23 DIAGNOSIS — G35 MS (MULTIPLE SCLEROSIS) (HCC): ICD-10-CM

## 2019-09-23 DIAGNOSIS — R30.0 DYSURIA: ICD-10-CM

## 2019-09-23 LAB
APPEARANCE UR: CLEAR
COLOR UR AUTO: NORMAL
GLUCOSE UR STRIP.AUTO-MCNC: NEGATIVE MG/DL
KETONES UR STRIP.AUTO-MCNC: NEGATIVE MG/DL
LEUKOCYTE ESTERASE UR QL STRIP.AUTO: NEGATIVE
PH UR STRIP.AUTO: 6 [PH] (ref 5–8)
PROT UR QL STRIP: NEGATIVE MG/DL
RBC UR QL AUTO: NEGATIVE
SP GR UR STRIP.AUTO: 1.01

## 2019-09-23 PROCEDURE — 36415 COLL VENOUS BLD VENIPUNCTURE: CPT | Performed by: PHYSICIAN ASSISTANT

## 2019-09-23 NOTE — NON-PROVIDER
Jessica Colon is a 54 y.o. female here for a non-provider visit for venipuncture.    If abnormal was an in office provider notified today (if so, indicate provider)? Yes  Routed to PCP? Yes

## 2019-11-13 ENCOUNTER — OFFICE VISIT (OUTPATIENT)
Dept: MEDICAL GROUP | Facility: CLINIC | Age: 54
End: 2019-11-13
Payer: MEDICARE

## 2019-11-13 VITALS
DIASTOLIC BLOOD PRESSURE: 76 MMHG | RESPIRATION RATE: 18 BRPM | HEIGHT: 61 IN | SYSTOLIC BLOOD PRESSURE: 104 MMHG | TEMPERATURE: 98.5 F | HEART RATE: 90 BPM | OXYGEN SATURATION: 96 % | BODY MASS INDEX: 23.41 KG/M2 | WEIGHT: 124 LBS

## 2019-11-13 DIAGNOSIS — R26.89 LOSS OF BALANCE: ICD-10-CM

## 2019-11-13 DIAGNOSIS — F32.0 CURRENT MILD EPISODE OF MAJOR DEPRESSIVE DISORDER WITHOUT PRIOR EPISODE (HCC): ICD-10-CM

## 2019-11-13 DIAGNOSIS — N28.9 RENAL INSUFFICIENCY: ICD-10-CM

## 2019-11-13 DIAGNOSIS — G35 MS (MULTIPLE SCLEROSIS) (HCC): ICD-10-CM

## 2019-11-13 PROBLEM — F32.9 CURRENT EPISODE OF MAJOR DEPRESSIVE DISORDER WITHOUT PRIOR EPISODE: Status: ACTIVE | Noted: 2019-11-13

## 2019-11-13 PROCEDURE — 99214 OFFICE O/P EST MOD 30 MIN: CPT | Performed by: PHYSICIAN ASSISTANT

## 2019-11-13 NOTE — PROGRESS NOTES
"cc:  Follow up    Subjective:     Jessica Colon is a 54 y.o. female presenting for follow up      Patient presents to the office for follow up.  Patient is here to follow up with labs and referral.  She states that she was in [place] and so has not received messages and has not been able to access mail.  She states that her symptoms have not improved. Patient states that she has not had any drive.  She has been feeling \"rubber legged and heavy\".    She has lost interest in things that she normally likes to do. She did feel the medrol dose pack helped some.  She is having difficulty with lack of appetite.  She is also having loss of balance issues.  She indicates that on October 9, she lost her son by suicide and this is been difficult for her as well.  Although she was having symptoms before this occurred, this is not helped.  She indicates that she has been on medications in the past to help with depression and anxiety and does not like the sensation or feeling she has when she is on medications and is not interested in beginning at this time.  She denies suicidal or homicidal ideation.  She is not interested in seeking help from a counselor.  She indicates that she has many friends who are in the mental health field and states that when she needs to, she talks with them.    Review of systems:  See above.  Denies any other symptoms unless previously indicated.    No current outpatient medications on file.    Allergies, past medical history, past surgical history, family history, social history reviewed and updated    Objective:     Vitals: /76 (BP Location: Left arm, Patient Position: Sitting, BP Cuff Size: Adult)   Pulse 90   Temp 36.9 °C (98.5 °F) (Temporal)   Resp 18   Ht 1.549 m (5' 1\")   Wt 56.2 kg (124 lb)   SpO2 96%   BMI 23.43 kg/m²   General: Alert, pleasant, NAD  EYES:   PERRL, EOMI, no icterus or pallor.  Conjunctivae and lids normal.   HENT:  Normocephalic.  External ears normal.  Neck supple. "   Abdomen: Not obese.  Skin: Warm, dry, no rashes.  Musculoskeletal: Gait is normal.  Moves all extremities well.  Neurological: No tremors, sensation grossly intact, CN2-12 intact.  Psych:  Affect/mood is depressed, judgement is good, memory is intact, grooming is appropriate.    Assessment/Plan:     Jessica was seen today for follow-up and fatigue.    Diagnoses and all orders for this visit:    MS (multiple sclerosis) (HCC)  Loss of balance    Patient does need to follow-up with neurology.  Information given so patient can make her neurology appointment.    Renal insufficiency  -     Renal Function Panel; Future    Possible dehydration.  We will repeat labs in about 2 to 3 weeks and follow-up in 4 weeks per    Current mild episode of major depressive disorder without prior episode (HCC)    Discussed medication.  Discussed counseling.  Patient declined all of the above.  She denies suicidal or homicidal ideation.  At this point we will continue to monitor symptoms.  Patient understands that if she changes her mind regarding counseling or medications, she can let us know at any time.    Return in about 4 weeks (around 12/11/2019), or if symptoms worsen or fail to improve.    Please note that this dictation was created using voice recognition software. I have made every reasonable attempt to correct obvious errors, but expect that there are errors of grammar and possible content that I did not discover before finalizing note.     This was a 30-minute appointment with greater than 50% spent in education and counseling.

## 2019-12-11 ENCOUNTER — NON-PROVIDER VISIT (OUTPATIENT)
Dept: MEDICAL GROUP | Facility: CLINIC | Age: 54
End: 2019-12-11
Payer: MEDICARE

## 2019-12-11 DIAGNOSIS — G35 MS (MULTIPLE SCLEROSIS) (HCC): ICD-10-CM

## 2019-12-11 PROCEDURE — 36415 COLL VENOUS BLD VENIPUNCTURE: CPT | Performed by: INTERNAL MEDICINE

## 2019-12-11 PROCEDURE — 99000 SPECIMEN HANDLING OFFICE-LAB: CPT | Performed by: INTERNAL MEDICINE

## 2023-09-26 NOTE — TELEPHONE ENCOUNTER
If patient's symptoms are worsening please have her follow-up at the nearest emergency room for evaluation and treatment.  
Patient presented today stating, in addition to the weakness & neuropathy in her left arm, she is now having weakness in her right hand. Patient also states she is having increased fatigue.    Please advise.  
normal